# Patient Record
Sex: FEMALE | Race: WHITE | NOT HISPANIC OR LATINO | Employment: OTHER | URBAN - METROPOLITAN AREA
[De-identification: names, ages, dates, MRNs, and addresses within clinical notes are randomized per-mention and may not be internally consistent; named-entity substitution may affect disease eponyms.]

---

## 2023-10-05 PROBLEM — H53.451 ALTITUDINAL SCOTOMA OF RIGHT EYE: Status: ACTIVE | Noted: 2023-10-05

## 2023-10-05 PROBLEM — E23.7 PITUITARY LESION (MULTI): Status: ACTIVE | Noted: 2023-10-05

## 2023-10-05 PROBLEM — J34.2 DEVIATED NASAL SEPTUM: Status: ACTIVE | Noted: 2023-10-05

## 2023-10-05 PROBLEM — D32.9 MENINGIOMA (MULTI): Status: ACTIVE | Noted: 2023-10-05

## 2023-10-05 PROBLEM — J32.3 SPHENOID SINUSITIS: Status: ACTIVE | Noted: 2023-10-05

## 2023-10-05 PROBLEM — H25.10 NUCLEAR SCLEROSIS: Status: ACTIVE | Noted: 2023-10-05
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
## 2023-10-05 PROBLEM — S61.459A DOG BITE, HAND: Status: ACTIVE | Noted: 2023-10-05

## 2023-10-05 PROBLEM — H43.819 VITREOUS DEGENERATION: Status: ACTIVE | Noted: 2023-10-05

## 2023-10-05 PROBLEM — M81.0 OSTEOPOROSIS: Status: ACTIVE | Noted: 2023-10-05

## 2023-10-05 PROBLEM — G93.89 SUPRASELLAR MASS: Status: ACTIVE | Noted: 2023-10-05

## 2023-10-05 PROBLEM — H53.9 VISUAL AURA: Status: ACTIVE | Noted: 2023-10-05

## 2023-10-05 PROBLEM — N39.0 UTI (URINARY TRACT INFECTION): Status: ACTIVE | Noted: 2023-10-05

## 2023-10-05 RX ORDER — OXYCODONE HYDROCHLORIDE 10 MG/1
10 TABLET ORAL EVERY 4 HOURS PRN
COMMUNITY
Start: 2022-05-03

## 2023-10-05 RX ORDER — LANSOPRAZOLE 30 MG/1
30 CAPSULE, DELAYED RELEASE ORAL
COMMUNITY
Start: 2021-11-23

## 2023-10-05 RX ORDER — URSODIOL 300 MG/1
600 CAPSULE ORAL 2 TIMES DAILY
COMMUNITY

## 2023-10-05 RX ORDER — CHOLECALCIFEROL (VITAMIN D3) 25 MCG
1 TABLET ORAL DAILY
COMMUNITY
Start: 2021-11-23

## 2023-10-05 RX ORDER — EZETIMIBE 10 MG/1
1 TABLET ORAL DAILY
COMMUNITY
Start: 2021-11-23

## 2023-10-05 RX ORDER — CHOLECALCIFEROL (VITAMIN D3) 50 MCG
50 TABLET ORAL DAILY
COMMUNITY

## 2023-10-05 RX ORDER — AMLODIPINE BESYLATE 10 MG/1
1 TABLET ORAL DAILY
COMMUNITY
Start: 2022-05-03

## 2023-10-05 RX ORDER — ACETAMINOPHEN 325 MG/1
650 TABLET ORAL EVERY 6 HOURS PRN
COMMUNITY
Start: 2022-05-03

## 2023-10-05 RX ORDER — URSODIOL 300 MG/1
1 CAPSULE ORAL 2 TIMES DAILY
COMMUNITY
Start: 2021-01-21

## 2023-10-05 RX ORDER — LISINOPRIL 40 MG/1
1 TABLET ORAL DAILY
COMMUNITY
Start: 2021-08-03

## 2023-10-05 RX ORDER — SENNOSIDES 8.6 MG/1
2 TABLET ORAL DAILY
COMMUNITY
Start: 2022-05-03

## 2023-10-05 RX ORDER — SCOLOPAMINE TRANSDERMAL SYSTEM 1 MG/1
1 PATCH, EXTENDED RELEASE TRANSDERMAL
COMMUNITY
Start: 2022-05-03

## 2023-10-05 RX ORDER — PHENYLEPHRINE HCL 10 MG
500 TABLET ORAL DAILY
COMMUNITY
Start: 2021-11-23

## 2023-10-05 RX ORDER — OXYCODONE HYDROCHLORIDE 5 MG/1
5 TABLET ORAL EVERY 4 HOURS PRN
COMMUNITY
Start: 2022-05-03

## 2023-10-05 RX ORDER — CIDER VINEGAR 300 MG
300 TABLET ORAL DAILY
COMMUNITY
Start: 2021-11-23

## 2023-10-05 RX ORDER — DOCUSATE SODIUM 100 MG/1
100 CAPSULE, LIQUID FILLED ORAL 2 TIMES DAILY
COMMUNITY
Start: 2022-05-03

## 2023-10-05 RX ORDER — CYCLOBENZAPRINE HCL 5 MG
5 TABLET ORAL EVERY 8 HOURS PRN
COMMUNITY
Start: 2022-05-03

## 2023-10-05 RX ORDER — RISEDRONATE SODIUM 150 MG/1
150 TABLET, FILM COATED ORAL
COMMUNITY
Start: 2020-10-21

## 2023-10-06 ENCOUNTER — OFFICE VISIT (OUTPATIENT)
Dept: OPHTHALMOLOGY | Facility: CLINIC | Age: 73
End: 2023-10-06
Payer: COMMERCIAL

## 2023-10-06 DIAGNOSIS — D32.9 MENINGIOMA (MULTI): ICD-10-CM

## 2023-10-06 DIAGNOSIS — E23.7 PITUITARY LESION (MULTI): Primary | ICD-10-CM

## 2023-10-06 DIAGNOSIS — H53.16 CHARLES BONNET SYNDROME: ICD-10-CM

## 2023-10-06 PROCEDURE — 92083 EXTENDED VISUAL FIELD XM: CPT | Performed by: PSYCHIATRY & NEUROLOGY

## 2023-10-06 PROCEDURE — 1159F MED LIST DOCD IN RCRD: CPT | Performed by: PSYCHIATRY & NEUROLOGY

## 2023-10-06 PROCEDURE — 1160F RVW MEDS BY RX/DR IN RCRD: CPT | Performed by: PSYCHIATRY & NEUROLOGY

## 2023-10-06 PROCEDURE — 1036F TOBACCO NON-USER: CPT | Performed by: PSYCHIATRY & NEUROLOGY

## 2023-10-06 PROCEDURE — 99214 OFFICE O/P EST MOD 30 MIN: CPT | Performed by: PSYCHIATRY & NEUROLOGY

## 2023-10-06 PROCEDURE — 92133 CPTRZD OPH DX IMG PST SGM ON: CPT | Performed by: PSYCHIATRY & NEUROLOGY

## 2023-10-06 RX ORDER — MENTHOL 40 MG/ML
GEL TOPICAL
COMMUNITY
Start: 2022-05-11

## 2023-10-06 RX ORDER — CLOBETASOL PROPIONATE 0.5 MG/G
OINTMENT TOPICAL
COMMUNITY
Start: 2022-10-19

## 2023-10-06 RX ORDER — MONTELUKAST SODIUM 4 MG/1
TABLET, CHEWABLE ORAL DAILY
COMMUNITY
Start: 2023-09-19

## 2023-10-06 ASSESSMENT — CUP TO DISC RATIO
OS_RATIO: 0.3
OD_RATIO: 0.3

## 2023-10-06 ASSESSMENT — CONF VISUAL FIELD
OS_SUPERIOR_NASAL_RESTRICTION: 0
OS_SUPERIOR_TEMPORAL_RESTRICTION: 0
OD_INFERIOR_TEMPORAL_RESTRICTION: 0
OS_INFERIOR_NASAL_RESTRICTION: 0
OS_INFERIOR_TEMPORAL_RESTRICTION: 0
OD_NORMAL: 1
OD_SUPERIOR_NASAL_RESTRICTION: 0
OD_SUPERIOR_TEMPORAL_RESTRICTION: 0
OD_INFERIOR_NASAL_RESTRICTION: 0
OS_NORMAL: 1

## 2023-10-06 ASSESSMENT — EXTERNAL EXAM - LEFT EYE: OS_EXAM: NORMAL

## 2023-10-06 ASSESSMENT — VISUAL ACUITY
METHOD: SNELLEN - LINEAR
CORRECTION_TYPE: GLASSES
OD_CC: 20/20-1
OS_CC: 20/20-2

## 2023-10-06 ASSESSMENT — ENCOUNTER SYMPTOMS
ENDOCRINE NEGATIVE: 0
PSYCHIATRIC NEGATIVE: 0
MUSCULOSKELETAL NEGATIVE: 0
NEUROLOGICAL NEGATIVE: 0
CONSTITUTIONAL NEGATIVE: 0
CARDIOVASCULAR NEGATIVE: 0
ALLERGIC/IMMUNOLOGIC NEGATIVE: 0
GASTROINTESTINAL NEGATIVE: 0
HEMATOLOGIC/LYMPHATIC NEGATIVE: 0
EYES NEGATIVE: 0
RESPIRATORY NEGATIVE: 0

## 2023-10-06 ASSESSMENT — TONOMETRY
OD_IOP_MMHG: 12
OS_IOP_MMHG: 12
IOP_METHOD: GOLDMANN APPLANATION

## 2023-10-06 ASSESSMENT — EXTERNAL EXAM - RIGHT EYE: OD_EXAM: NORMAL

## 2023-10-06 ASSESSMENT — SLIT LAMP EXAM - LIDS
COMMENTS: NORMAL
COMMENTS: NORMAL

## 2023-10-06 NOTE — PROGRESS NOTES
07/05/2022 MRI sella with contrast, which I personally reviewed previously, shows changes of craniotomy with interval tumor removal except a small right cavernous sinus remaining part.  04/27/2022 MRI sella with contrast, which I personally reviewed previously, shows severely limited motion-degraded images.  04/26/2022 CT head without contrast, which I personally reviewed previously, shows changes of recent right craniotomy.  02/23/2022 MRI sella with contrast, which I personally reviewed previously, shows changes of trans-sphenoidal resection, but with significant residual mass.  09/20/2021 MRI sella with contrast, which I personally reviewed previously, shows a mass of the sella as well as a frontal falx mass.  09/20/2021 CT sinus without contrast, which I personally reviewed previously, shows a calficied mass of the sella.    04/26/2022 pathology “BRAIN, RIGHT PARASELLAR TUMOR, BIOPSY: --PSAMMOMATOUS MENINGIOMA, WHO GRADE 1”  01/13/2022 pathology “A.  LESION POSTERIOR TO PITUITARY GLAND: PSAMMOMATOUS MENINGIOMA (POSTERIOR SELLA) RESECTION SPECIMEN B.  PITUITARY TUMOR: NO PATHOLOGICAL DIAGNOSIS: ADENOHYPOPHYSIS, BIOPSY”    10/6/2023 OCT RNFL OD 89 & OS 80. (Stable right eye (OD) & lower left eye (OS))  11/04/2022 OCT RNFL OD 88 & OS 84. (lower OU)  06/14/2022 OCT RNFL OD 94 & OS 89. (stable)  03/04/2022 OCT RNFL OD 94 with I thinning & OS 86 with borderline I thinning.    10/6/2023 HVF 24-2 OD fovea 36, wnl MD -1.80 & OS fovea 32, wnl MD -0.98.  11/04/2022 HVF 24-2 OD fovea 34, scatter MD -2.22 & OS fovea 33, scatter MD -2.32.  06/14/2022 HVF 24-2 OD fovea 35, inferior scatter MD -2.07 & OS fovea 37, wnl MD -1.17.  03/04/2022 HVF 24-2 OD fovea 36, superior altitudinal & possible enlarged blind spot MD -3.96 & OS fovea 36, scatter MD -3.17.    This 73 year-old woman with a history of sellar mass diagnosed as psamomatous menignioma status post trans-sphenoidal resection 1/13/2022 and craniotomy 4/26/2022, migraine,  meningioma presents in follow up for evaluation of transient visual disturbances and visual fields.    Her vision remains good. I do not have an explanation for the odd figures she saw, but I do not see evidence of a concerning problems. Billy Barron is a possibility even though is not as bad as often happens for that condition.    Plan    Observation.  Tumor follow up per neurosurgery.    Follow up in 1 year with HVF & OCT with physician dilation.

## 2024-07-19 NOTE — PROGRESS NOTES
"[facility]  Neurosurgery    History of Present Illness    Anne Helm is a 73 yo F with a PMH significant for HTN, HLD, GERD, osteoporosis, ocular migraines, who presented with blurred vision found to have a large sellar mass with OC compression. She is now s/p endoscopic endonasal resection with nasal septal flap with Dr. Kramer and Dr. Sams 01/13/2022. Final surgical pathology for psammomatous meningioma. Patient presented for evaluation for R pterional craniotomy for tumor resection on 04/26/22 with Dr. Kramer. 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 She continued to follow with endocrinology and Dr. Chisholm. Was last seen by Dr. Chisholm in October 2023 for episodes of visual disturbances but with normal visual fields. Not currently on hormone replacement therapy or steroids. Her last MR imaging was in July 2022 with continued post surgical changes from right pterional craniotomy with dural thickening. Residual enhancing neoplasm measures 1.3 x 0.4 x 0.8cm with no mass effect on OC. There is a secondary extra-axial dural based enhancing mass measuring 12.6 x 1.1 x 0.6cm previously measuring 1.5 x 1.4 x 1.1cm. Patient presents to clinic for FUV.     Endorses to ongoing headaches and an episode occurring March 27th with visual disturbances consistent with ocular migraine. Then again on June 25th she developed a severe migraine with N/V. Two day hurtado she developed blurred vision with slight headache - aching in left eye. Feels like she is coming out of sedation where she does not feel herself occurred x 3. Episodes lasting for a minute. Notes a history of syncope with needles. Denies dizziness or gait instability.           Objective      Vitals:   BP (!) 216/91 (BP Location: Left arm, Patient Position: Sitting, BP Cuff Size: Large adult)   Pulse 98   Temp 36.3 °C (97.4 °F)   Ht 1.708 m (5' 7.25\")   Wt 94 kg (207 lb 3.7 oz)   BMI 32.22 kg/m²         Physical " Exam:  A&Ox3   Fluent speech   EOMI; FC x 4   RANDLE; strength 5/5; no drift   Face and shoulder shrug symmetrical   SILT   Tongue midline   No focal motor deficits on exam   Gait normal          Relevant Results:    MRI Sella 07/05/22: Postsurgical changes of sellar/ suprasellar meningioma with continued residual enhancement measuring 1.3 x 0.4 x 0.8cm. A right falx extra- axial lesion measuring 12.6 x 1.1 x 0.6cm previously measuring 1.5x 1.4 x 1.1cm.         Assessment & Plan      Diagnosis:  Diagnoses and all orders for this visit:  Pituitary lesion (Multi)  -     MR sella w and wo IV contrast          Provider Impression:     Patient is a 74-year-old female presenting for follow up s/p R pterional craniotomy for resection 04/26/22 and endoscopic endonasal resection 01/13/21. 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 Continues to have migraine headaches with visual auras. Continues to follow with Dr. Chisholm. MRI last in 2022 with continued residual and stable right falx meningioma.     - MRI Sella w/wo for continued observation   - Continue following with endocrinology and ophthalmology     BP significantly elevated while in clinic with systolic in 200s even on repeat. She was directed to urgent care / ED and agreed but will be going to [place] which is closer to where she lives.     Once imaging is completed will review and call patient with results or she may follow up in clinic. Plan discussed with patient who was in agreement. All questions answered.     Medical History     Past Medical History:   Diagnosis Date    Other disorders of pituitary gland (Multi) 11/23/2021    Pituitary mass     Past Surgical History:   Procedure Laterality Date    BRAIN SURGERY Bilateral     HYSTERECTOMY      OTHER SURGICAL HISTORY  06/14/2022    Craniotomy    WRIST FRACTURE SURGERY       Social History     Tobacco Use    Smoking status: Never    Smokeless tobacco: Never     Family History   Problem Relation  Name Age of Onset    No Known Problems Mother       No Known Allergies  Current Outpatient Medications   Medication Instructions    acetaminophen (TYLENOL) 650 mg, oral, Every 6 hours PRN    amLODIPine (Norvasc) 10 mg tablet 1 tablet, oral, Daily    apple cider vinegar 300 mg, oral, Daily    cholecalciferol (Vitamin D-3) 25 MCG (1000 UT) tablet 1 tablet, oral, Daily    cholecalciferol (VITAMIN D-3) 50 mcg, oral, Daily    cinnamon (CINNAMON) 500 mg, oral, Daily    clobetasol (Temovate) 0.05 % ointment     colestipol (Colestid) 1 gram tablet oral, Daily    cyclobenzaprine (FLEXERIL) 5 mg, oral, Every 8 hours PRN    docusate sodium (COLACE) 100 mg, oral, 2 times daily    ezetimibe (Zetia) 10 mg tablet 1 tablet, oral, Daily    heparin sodium,bovine (HEPARIN, BOVINE, INJ) 5,000 Units, subcutaneous, Every 8 hours    lansoprazole (PREVACID) 30 mg, oral, Daily before breakfast    lisinopril 40 mg tablet 1 tablet, oral, Daily    menthol (Biofreeze, menthol,) 4 % gel Apply 1 brian, Topical, BID, PRN as needed for pain, # 118 mL, 0 Refill(s), Gel, 86.4    oxyCODONE (ROXICODONE) 5 mg, oral, Every 4 hours PRN    oxyCODONE (ROXICODONE) 10 mg, oral, Every 4 hours PRN    risedronate (ACTONEL) 150 mg, oral, Daily before breakfast    scopolamine (Transderm-Scop) 1 mg over 3 days patch 3 day 1 patch, transdermal, Every 72 hours    sennosides (Senokot) 8.6 mg tablet 2 tablets, oral, Daily    ursodiol (Actigall) 300 mg capsule 1 capsule, oral, 2 times daily    ursodiol (ACTIGALL) 600 mg, oral, 2 times daily

## 2024-07-22 ENCOUNTER — OFFICE VISIT (OUTPATIENT)
Dept: NEUROSURGERY | Facility: HOSPITAL | Age: 74
End: 2024-07-22
Payer: COMMERCIAL

## 2024-07-22 VITALS
WEIGHT: 207.23 LBS | BODY MASS INDEX: 32.53 KG/M2 | TEMPERATURE: 97.4 F | HEIGHT: 67 IN | HEART RATE: 98 BPM | DIASTOLIC BLOOD PRESSURE: 91 MMHG | SYSTOLIC BLOOD PRESSURE: 216 MMHG

## 2024-07-22 DIAGNOSIS — E23.7 PITUITARY LESION (MULTI): Primary | ICD-10-CM

## 2024-07-22 PROCEDURE — 99215 OFFICE O/P EST HI 40 MIN: CPT | Performed by: NURSE PRACTITIONER

## 2024-07-22 PROCEDURE — 1159F MED LIST DOCD IN RCRD: CPT | Performed by: NURSE PRACTITIONER

## 2024-07-22 PROCEDURE — 1126F AMNT PAIN NOTED NONE PRSNT: CPT | Performed by: NURSE PRACTITIONER

## 2024-07-22 PROCEDURE — 1157F ADVNC CARE PLAN IN RCRD: CPT | Performed by: NURSE PRACTITIONER

## 2024-07-22 PROCEDURE — 3008F BODY MASS INDEX DOCD: CPT | Performed by: NURSE PRACTITIONER

## 2024-07-22 ASSESSMENT — PAIN SCALES - GENERAL: PAINLEVEL: 0-NO PAIN

## 2024-07-22 ASSESSMENT — ENCOUNTER SYMPTOMS
DEPRESSION: 1
OCCASIONAL FEELINGS OF UNSTEADINESS: 0
LOSS OF SENSATION IN FEET: 0

## 2024-07-25 ENCOUNTER — HOSPITAL ENCOUNTER (OUTPATIENT)
Dept: RADIOLOGY | Facility: HOSPITAL | Age: 74
Discharge: HOME | End: 2024-07-25
Payer: COMMERCIAL

## 2024-07-25 PROCEDURE — A9575 INJ GADOTERATE MEGLUMI 0.1ML: HCPCS | Performed by: NURSE PRACTITIONER

## 2024-07-25 PROCEDURE — 2550000001 HC RX 255 CONTRASTS: Performed by: NURSE PRACTITIONER

## 2024-07-25 PROCEDURE — 70553 MRI BRAIN STEM W/O & W/DYE: CPT

## 2024-07-25 RX ORDER — GADOTERATE MEGLUMINE 376.9 MG/ML
18 INJECTION INTRAVENOUS
Status: COMPLETED | OUTPATIENT
Start: 2024-07-25 | End: 2024-07-25

## 2024-08-05 ENCOUNTER — TELEPHONE (OUTPATIENT)
Dept: NEUROSURGERY | Facility: HOSPITAL | Age: 74
End: 2024-08-05
Payer: COMMERCIAL

## 2024-08-05 NOTE — TELEPHONE ENCOUNTER
I spoke with patient about her image results and follow-plan. She verbalized an understanding and denied further questions.    Mary Li R.N.    Follow up: Continue with imaging surveillance with repeat MR sella w/wo in 1 year. Continue following with ophthalmology.

## 2024-08-05 NOTE — RESULT ENCOUNTER NOTE
MR Sella w/wo: Continued postsurgical changed from right pterional craniotomy with dural thickening that is postsurgical and without nodularity. Continued postsurgical changes from resection of sellar / suprasellar lesion with stable enhancing mass measuring 1.3 x 1.0 x 0.4cm. No mass effect or displacement of OC. Continued extra-axial dural based lesion measuring 1.2 x 0.9 x 0.9cm which is stable when compared with her prior MR in 2022.     Follow up: Continue with imaging surveillance with repeat MR sella w/wo in 1 year. Continue following with ophthalmology.

## 2024-10-08 ENCOUNTER — APPOINTMENT (OUTPATIENT)
Dept: OPHTHALMOLOGY | Facility: CLINIC | Age: 74
End: 2024-10-08
Payer: COMMERCIAL

## 2024-12-16 ENCOUNTER — APPOINTMENT (OUTPATIENT)
Dept: OPHTHALMOLOGY | Facility: CLINIC | Age: 74
End: 2024-12-16
Payer: COMMERCIAL

## 2024-12-16 DIAGNOSIS — E23.7 PITUITARY LESION (MULTI): Primary | ICD-10-CM

## 2024-12-16 DIAGNOSIS — D32.9 MENINGIOMA (MULTI): ICD-10-CM

## 2024-12-16 PROCEDURE — 92133 CPTRZD OPH DX IMG PST SGM ON: CPT | Performed by: PSYCHIATRY & NEUROLOGY

## 2024-12-16 PROCEDURE — 92083 EXTENDED VISUAL FIELD XM: CPT | Performed by: PSYCHIATRY & NEUROLOGY

## 2024-12-16 PROCEDURE — 99214 OFFICE O/P EST MOD 30 MIN: CPT | Performed by: PSYCHIATRY & NEUROLOGY

## 2024-12-16 ASSESSMENT — EXTERNAL EXAM - RIGHT EYE: OD_EXAM: NORMAL

## 2024-12-16 ASSESSMENT — VISUAL ACUITY
METHOD: SNELLEN - LINEAR
OD_CC: 20/20-1
CORRECTION_TYPE: GLASSES
OS_CC: 20/20-2

## 2024-12-16 ASSESSMENT — CONF VISUAL FIELD
OS_SUPERIOR_TEMPORAL_RESTRICTION: 0
OS_INFERIOR_NASAL_RESTRICTION: 0
OD_SUPERIOR_TEMPORAL_RESTRICTION: 0
OD_NORMAL: 1
OS_SUPERIOR_NASAL_RESTRICTION: 0
OD_INFERIOR_NASAL_RESTRICTION: 0
OD_INFERIOR_TEMPORAL_RESTRICTION: 0
OS_INFERIOR_TEMPORAL_RESTRICTION: 0
OS_NORMAL: 1
OD_SUPERIOR_NASAL_RESTRICTION: 0

## 2024-12-16 ASSESSMENT — TONOMETRY
OS_IOP_MMHG: 10
OD_IOP_MMHG: 10
IOP_METHOD: GOLDMANN APPLANATION

## 2024-12-16 ASSESSMENT — ENCOUNTER SYMPTOMS
NEUROLOGICAL NEGATIVE: 0
PSYCHIATRIC NEGATIVE: 0
EYES NEGATIVE: 1
ALLERGIC/IMMUNOLOGIC NEGATIVE: 0
GASTROINTESTINAL NEGATIVE: 0
HEMATOLOGIC/LYMPHATIC NEGATIVE: 0
CARDIOVASCULAR NEGATIVE: 0
RESPIRATORY NEGATIVE: 0
CONSTITUTIONAL NEGATIVE: 0
MUSCULOSKELETAL NEGATIVE: 0
ENDOCRINE NEGATIVE: 0

## 2024-12-16 ASSESSMENT — CUP TO DISC RATIO
OD_RATIO: 0.3
OS_RATIO: 0.3

## 2024-12-16 ASSESSMENT — EXTERNAL EXAM - LEFT EYE: OS_EXAM: NORMAL

## 2024-12-16 ASSESSMENT — SLIT LAMP EXAM - LIDS
COMMENTS: NORMAL
COMMENTS: NORMAL

## 2024-12-16 NOTE — PROGRESS NOTES
07/25/2024 MRI sella with contrast, which I personally reviewed, shows residual mass of the right cavernous sinus and the falx meningioma.    07/05/2022 MRI sella with contrast, which I personally reviewed previously, shows changes of craniotomy with interval tumor removal except a small right cavernous sinus remaining part.  04/27/2022 MRI sella with contrast, which I personally reviewed previously, shows severely limited motion-degraded images.  04/26/2022 CT head without contrast, which I personally reviewed previously, shows changes of recent right craniotomy.  02/23/2022 MRI sella with contrast, which I personally reviewed previously, shows changes of trans-sphenoidal resection, but with significant residual mass.  09/20/2021 MRI sella with contrast, which I personally reviewed previously, shows a mass of the sella as well as a frontal falx mass.  09/20/2021 CT sinus without contrast, which I personally reviewed previously, shows a calficied mass of the sella.    04/26/2022 pathology “BRAIN, RIGHT PARASELLAR TUMOR, BIOPSY: --PSAMMOMATOUS MENINGIOMA, WHO GRADE 1”  01/13/2022 pathology “A.  LESION POSTERIOR TO PITUITARY GLAND: PSAMMOMATOUS MENINGIOMA (POSTERIOR SELLA) RESECTION SPECIMEN B.  PITUITARY TUMOR: NO PATHOLOGICAL DIAGNOSIS: ADENOHYPOPHYSIS, BIOPSY”    12/16/2024 OCT RNFL OD 86 & OS 82. (Stable)  10/06/2023 OCT RNFL OD 89 & OS 80. (Stable right eye (OD) & lower left eye (OS))  11/04/2022 OCT RNFL OD 88 & OS 84. (lower OU)  06/14/2022 OCT RNFL OD 94 & OS 89. (stable)  03/04/2022 OCT RNFL OD 94 with I thinning & OS 86 with borderline I thinning.    12/16/2024 HVF 24-2 OD fovea 35, scatter MD -3.58 & OS fovea 35, scatter MD -1.27.  10/06/2023 HVF 24-2 OD fovea 36, wnl MD -1.80 & OS fovea 32, wnl MD -0.98.  11/04/2022 HVF 24-2 OD fovea 34, scatter MD -2.22 & OS fovea 33, scatter MD -2.32.  06/14/2022 HVF 24-2 OD fovea 35, inferior scatter MD -2.07 & OS fovea 37, wnl MD -1.17.  03/04/2022 HVF 24-2 OD fovea  36, superior altitudinal & possible enlarged blind spot MD -3.96 & OS fovea 36, scatter MD -3.17.    This 74 year-old woman with a history of sellar mass diagnosed as psamomatous menignioma status post trans-sphenoidal resection 1/13/2022 and craniotomy 4/26/2022, migraine, meningioma presents in follow up for evaluation of transient visual disturbances and visual fields.    Vision remains good with a few areas on Guevara visual field (HVF) that appear more scatter than true depression of the visual field. Odd figures in her vision did not return. We discussed whether to continue surveillance with me versus primary eye care.    Other transient problems sound consistent with migraine.    Vestibular problems sound like BPPV, but without confirmatory testing today. I recommend a trial of repositioning exercises with information provided.    Plan    Observation.  Tumor follow up per neurosurgery.  Trial of vestibular exercises.    Follow up in as needed with HVF & OCT with physician dilation.